# Patient Record
Sex: FEMALE
[De-identification: names, ages, dates, MRNs, and addresses within clinical notes are randomized per-mention and may not be internally consistent; named-entity substitution may affect disease eponyms.]

---

## 2021-01-14 PROBLEM — Z00.00 ENCOUNTER FOR PREVENTIVE HEALTH EXAMINATION: Status: ACTIVE | Noted: 2021-01-14

## 2021-01-15 ENCOUNTER — APPOINTMENT (OUTPATIENT)
Dept: OTOLARYNGOLOGY | Facility: CLINIC | Age: 61
End: 2021-01-15
Payer: COMMERCIAL

## 2021-01-15 VITALS — WEIGHT: 145 LBS | BODY MASS INDEX: 22.76 KG/M2 | HEIGHT: 67 IN | TEMPERATURE: 97.8 F

## 2021-01-15 DIAGNOSIS — Z78.9 OTHER SPECIFIED HEALTH STATUS: ICD-10-CM

## 2021-01-15 DIAGNOSIS — Z85.820 PERSONAL HISTORY OF MALIGNANT MELANOMA OF SKIN: ICD-10-CM

## 2021-01-15 DIAGNOSIS — H93.299 OTHER ABNORMAL AUDITORY PERCEPTIONS, UNSPECIFIED EAR: ICD-10-CM

## 2021-01-15 DIAGNOSIS — Z80.3 FAMILY HISTORY OF MALIGNANT NEOPLASM OF BREAST: ICD-10-CM

## 2021-01-15 DIAGNOSIS — Z82.49 FAMILY HISTORY OF ISCHEMIC HEART DISEASE AND OTHER DISEASES OF THE CIRCULATORY SYSTEM: ICD-10-CM

## 2021-01-15 DIAGNOSIS — Z86.39 PERSONAL HISTORY OF OTHER ENDOCRINE, NUTRITIONAL AND METABOLIC DISEASE: ICD-10-CM

## 2021-01-15 PROCEDURE — 92550 TYMPANOMETRY & REFLEX THRESH: CPT

## 2021-01-15 PROCEDURE — 99204 OFFICE O/P NEW MOD 45 MIN: CPT | Mod: 25

## 2021-01-15 PROCEDURE — 92557 COMPREHENSIVE HEARING TEST: CPT

## 2021-01-15 PROCEDURE — 99072 ADDL SUPL MATRL&STAF TM PHE: CPT

## 2021-01-15 PROCEDURE — 31231 NASAL ENDOSCOPY DX: CPT

## 2021-01-15 RX ORDER — FLUTICASONE PROPIONATE 50 UG/1
50 SPRAY, METERED NASAL
Qty: 1 | Refills: 5 | Status: ACTIVE | COMMUNITY
Start: 2021-01-15 | End: 1900-01-01

## 2021-01-21 LAB — BACTERIA FLD CULT: NORMAL

## 2021-01-21 NOTE — HISTORY OF PRESENT ILLNESS
[de-identified] : PILI MARQUEZ is a 50 year old female who comes in complaining of Inflammation of the left ear. Her ear feels clogged. Her sinuses feel inflamed. She has noted some green discharge. Her symptoms are worse on her left than her right. She had sinus surgery more than 10 years ago and she had been doing well. She notes that her reflux is more bothersome and she takes medication once in a while.She has nasal congestion and headaches. She describes it as a pressure, throbbing sensation along the forehead and the top of her head. She does not believe that she has allergies but has been having a fair amount of heartburn and the heavy mucus in her throat. She has to clear her throat constantly. She has not been on antibiotics.The patient had no other ear nose or throat complaints at this visit.

## 2021-01-21 NOTE — PHYSICAL EXAM
[FreeTextEntry1] : \par The patient was alert and oriented and in no distress.\par Voice was clear.\par \par Face:\par The patient had no facial asymmetry or mass.\par The skin was unremarkable.\par \par Eyes:\par The pupils were equal round and reactive to light and accommodation.\par There was no significant nystagmus or disconjugate gaze noted.\par \par Nose: \par The external nose had no significant deformity.  There was no facial tenderness.  On anterior rhinoscopy, the nasal mucosa was clear.  The anterior septum was midline.  There were no visualized polyps purulence  or masses.\par \par Oral cavity:\par The oral mucosa was normal.\par The oral and base of tongue were clear and without mass.\par The gingival and buccal mucosa were moist and without lesions.\par The palate moved well.\par There was no cleft to the palate.\par There appeared to be good salivary flow.  \par There was no pus, erythema or mass in the oral cavity.\par \par \par Ears:\par The external ears were normal without deformity.\par The ear canals were clear.\par The tympanic membranes were intact and normal.\par \par Neck: \par The neck was symmetrical.\par The parotid and submandibular glands were normal without masses.\par The trachea was midline and there was no unusual crepitus.\par The thyroid was smooth and nontender and no masses were palpated.\par There was no significant cervical adenopathy.\par \par \par Neuro:\par Neurologically, the patient was awake, alert, and oriented to person, place and time. There were no obvious focal neurologic abnormalities.  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  The tympanograms were type A and the otoacoustic emissions were intact bilaterally.\par The audiogram was reviewed with the patient. Numerous perhaps a minimal asymmetry but normal tympanograms.\par \par Nasal endoscopy:\par \par Nasal endoscopy was done with topical anesthesia and a flexible endoscope to evaluate for nasal polyps, chronic sinusitis and response to therapy.\par Endocoscopy was performed to inspect the interior of the nasal cavity, the nasal septum,  the middle and superior meati, the inferior, middle and superior turbinates, and the spheno-ethmoidal  recesses, the nasopharynx and eustachian tube orifices bilaterally\par \par Endoscopy was done with Covid precautions and with video. All risks and benefits were discussed with the patient and consent obtained.\par \par \par Evaluation showed that the septum was midline.  There was no significant mucosal disease.  The inferior turbinates and middle turbinates were normal.  On both sides, the surgically opened ethmoids, antra, and frontal recesses were clear.  There was no evidence of polypoid recurrences and no purulence.  The mucosa was close to stage zero.  The nasopharynx was benign.    The middle and  superior meati were normal and the sphenoethmoidal recesses were without evidence of disease\par \par Although all of her sinuses otherwise looked excellent, there was a moderate amount of bloody-colored discharge from the left antrum. This was cultured endoscopically and then suctioned.\par \par Flexible fiberoptic laryngoscopy: CPT 19709\par Indications: Dysphagia\par Procedure note:\par  \par Flexible fiberoptic laryngoscopy was performed because of dysphagia and the patient's inability to tolerate adequate mirror examination.\par The nasal cavity was anesthetized with Pontocaine plus Afrin.\par \par \par The nasal cavity was anesthetized with Pontocaine and Afrin.\par The flexible endoscope was placed into the patient's nasal cavity.\par The nasopharynx was without masses.\par The oropharynx, vallecula and base of tongue had no masses.\par The epiglottis, aryepiglottic folds and false vocal cords were normal.\par The pyriform sinuses were without mucosal lesions or pooling of secretions.  \par The laryngeal ventricles were without lesions.\par The visualized subglottis was without mass.\par The lateral and posterior pharyngeal walls were clear and symmetrical.\par The vocal folds were clear and mobile; they abducted and adducted normally.\par There was no interarytenoid mass or fullness.\par There was significant posterior laryngeal inflammation consistent with reflux.\par \par Endoscopy was done with Covid precautions and with video. All risks and benefits were discussed with the patient and consent obtained.

## 2021-01-21 NOTE — REVIEW OF SYSTEMS
[Ear Pain] : ear pain [Nasal Congestion] : nasal congestion [Negative] : Heme/Lymph [de-identified] : post nasal drip

## 2021-01-21 NOTE — CONSULT LETTER
[FreeTextEntry2] : THIERRY NUNN\par  [FreeTextEntry1] : \par \par Dear  Dr. THIERRY NUNN,\par \par I had the pleasure of seeing your patient today.  \par Please see my note below.\par \par \par Thank you very much for allowing me to participate in the care of your patient.\par \par Sincerely,\par \par \par I hope this note finds you and your family  well.   \par \par \par Alfredo Lim\par \par \par Lux Lim MD\par [facility]\par [facility]\par  [facility]\par \par

## 2021-01-21 NOTE — ASSESSMENT
[FreeTextEntry1] : It was my impression that she has E. or discomfort in the feeling that her ears are underwater. Some of this may be from her temporomandibular joint as she has a malocclusion but I did not find much inflammation or tenderness.\par Her sinuses, other than for her left antrum looked excellent but there was a left maxillary sinusitis.\par Pending culture results I recommended a course of cefuroxime, fluticasone and continuing with the nasal rinses. I felt she probably should not use the silver spray. It had been used and the pre-antibiotic era for infection as an antiseptic and there is a risk of argentism if it's a high dose and I am not certain of the dosage in this spray\par Some of the symptoms are likely from her reflux which appears to have exacerbated. She tells me that her gastroenterologist has changed her to a stronger reflux medication. She has not started this yet and I suggested continuing with her GI care as well and would like to reevaluate in 3- 4 weeks\par I would change antibiotics as indicated by her culture.\par \par \par 1/21/21    culture was noral chalino    ?anaerobe?   would finish rx.  RLP

## 2021-02-17 ENCOUNTER — APPOINTMENT (OUTPATIENT)
Dept: OTOLARYNGOLOGY | Facility: CLINIC | Age: 61
End: 2021-02-17
Payer: COMMERCIAL

## 2021-02-17 VITALS — HEIGHT: 67 IN | TEMPERATURE: 97.2 F | BODY MASS INDEX: 22.76 KG/M2 | WEIGHT: 145 LBS

## 2021-02-17 PROCEDURE — 31231 NASAL ENDOSCOPY DX: CPT

## 2021-02-17 PROCEDURE — 99072 ADDL SUPL MATRL&STAF TM PHE: CPT

## 2021-02-17 PROCEDURE — 99213 OFFICE O/P EST LOW 20 MIN: CPT | Mod: 25

## 2021-02-17 NOTE — HISTORY OF PRESENT ILLNESS
[de-identified] : PILI MARQUEZ was seen in followup on February 17. Her sinuses were no longer bothering her and her ear it no longer feels full. However, she had GI symptoms from the antibiotic. She also had to stop her Dexilant because of this.The patient had no other ear nose or throat complaints at this visit.

## 2021-02-17 NOTE — CONSULT LETTER
[FreeTextEntry2] : THIERRY NUNN\par  [FreeTextEntry1] : \par \par Dear  Dr. THIERRY NUNN,\par \par I had the pleasure of seeing your patient today.  \par Please see my note below.\par \par \par Thank you very much for allowing me to participate in the care of your patient.\par \par Sincerely,\par \par \par I hope this note finds you and your family  well.   \par \par \par Alfredo Lim\par \par \par Lux Lim MD\par [facility]\par [facility]\par  [facility]\par \par

## 2021-02-17 NOTE — ASSESSMENT
[FreeTextEntry1] : It was my impression that her sinusitis had clinically resolved and her sinuses all look closer to stage 0 at this time. She still has laryngeal evidence of reflux and it seems likely that she was refluxing as high as nasal cavities. She had GI symptoms from cefuroxime and I reviewed this with her.\par At this point, I suggested continuing to use nasal saline rinses as needed and followup for her GI care antireflux care and would like to see her back in followup as needed

## 2021-02-17 NOTE — PHYSICAL EXAM
[FreeTextEntry1] : \par The patient was alert and oriented and in no distress.\par Voice was clear.\par \par Face:\par The patient had no facial asymmetry or mass.\par The skin was unremarkable.\par \par Eyes:\par The pupils were equal round and reactive to light and accommodation.\par There was no significant nystagmus or disconjugate gaze noted.\par \par Nose: \par The external nose had no significant deformity.  There was no facial tenderness.  On anterior rhinoscopy, the nasal mucosa was clear.  The anterior septum was midline.  There were no visualized polyps purulence  or masses.\par \par Oral cavity:\par The oral mucosa was normal.\par The oral and base of tongue were clear and without mass.\par The gingival and buccal mucosa were moist and without lesions.\par The palate moved well.\par There was no cleft to the palate.\par There appeared to be good salivary flow.  \par There was no pus, erythema or mass in the oral cavity.\par \par \par Ears:\par The external ears were normal without deformity.\par The ear canals were clear.\par The tympanic membranes were intact and normal.\par \par Neck: \par The neck was symmetrical.\par The parotid and submandibular glands were normal without masses.\par The trachea was midline and there was no unusual crepitus.\par The thyroid was smooth and nontender and no masses were palpated.\par There was no significant cervical adenopathy.\par \par \par Neuro:\par Neurologically, the patient was awake, alert, and oriented to person, place and time. There were no obvious focal neurologic abnormalities.  Cranial nerves II through XII were grossly intact.\par \par \par TMJ:\par The temporomandibular joints were nontender.\par There was no abnormal crepitus and no significant malocclusion\par \par  [de-identified] : Nasal endoscopy:\par \par Nasal endoscopy was done with topical anesthesia and a flexible endoscope to evaluate for nasal polyps, chronic sinusitis and response to therapy.\par Endocoscopy was performed to inspect the interior of the nasal cavity, the nasal septum,  the middle and superior meati, the inferior, middle and superior turbinates, and the spheno-ethmoidal  recesses, the nasopharynx and eustachian tube orifices bilaterally\par \par Endoscopy was done with Covid precautions and with video. All risks and benefits were discussed with the patient and consent obtained.\par \par \par Evaluation showed that the septum was midline.  There was no significant mucosal disease.  The inferior turbinates and middle turbinates were normal.  On the left side sides, the surgically opened ethmoids, antrum, and frontal recesses were clear.  There was no evidence of polypoid recurrences and no purulence.  The mucosa was close to stage zero.  The nasopharynx was benign.    The middle and  superior meati were normal and the sphenoethmoidal recesses were without evidence of disease

## 2021-02-17 NOTE — REASON FOR VISIT
[Subsequent Evaluation] : a subsequent evaluation for [Sinusitis] : sinusitis [FreeTextEntry2] : reflux

## 2021-11-19 ENCOUNTER — APPOINTMENT (OUTPATIENT)
Dept: OTOLARYNGOLOGY | Facility: CLINIC | Age: 61
End: 2021-11-19

## 2022-09-29 ENCOUNTER — APPOINTMENT (OUTPATIENT)
Dept: OTOLARYNGOLOGY | Facility: CLINIC | Age: 62
End: 2022-09-29

## 2022-09-29 VITALS — TEMPERATURE: 97.3 F | HEIGHT: 67 IN | BODY MASS INDEX: 22.76 KG/M2 | WEIGHT: 145 LBS

## 2022-09-29 DIAGNOSIS — J32.9 CHRONIC SINUSITIS, UNSPECIFIED: ICD-10-CM

## 2022-09-29 DIAGNOSIS — M26.609 UNSPECIFIED TEMPOROMANDIBULAR JOINT DISORDER: ICD-10-CM

## 2022-09-29 DIAGNOSIS — J31.0 CHRONIC RHINITIS: ICD-10-CM

## 2022-09-29 DIAGNOSIS — K21.9 GASTRO-ESOPHAGEAL REFLUX DISEASE W/OUT ESOPHAGITIS: ICD-10-CM

## 2022-09-29 PROCEDURE — 99214 OFFICE O/P EST MOD 30 MIN: CPT | Mod: 25

## 2022-09-29 PROCEDURE — 31231 NASAL ENDOSCOPY DX: CPT

## 2022-09-29 RX ORDER — CEFUROXIME AXETIL 250 MG/1
250 TABLET ORAL
Qty: 20 | Refills: 1 | Status: COMPLETED | COMMUNITY
Start: 2021-01-15 | End: 2022-09-29

## 2022-09-29 RX ORDER — AZELASTINE HYDROCHLORIDE 137 UG/1
137 SPRAY, METERED NASAL
Qty: 1 | Refills: 3 | Status: ACTIVE | COMMUNITY
Start: 2022-09-29 | End: 1900-01-01

## 2022-09-29 NOTE — ASSESSMENT
[FreeTextEntry1] : It was my impression that her sinuses actually look quite good.  She does have some mucosal congestion and probable atopy.  I suggested continuing with her allergy care.  Her facial pain seems more consistent with her temporomandibular joint and possibly from reflux.\par It was my impression is that the patient's symptoms were from the temporomandibular joint.\par I recommended warm compresses, a soft diet, and anti-inflammatories.\par I would recommend following up further with the patient's dentist for a possible bruxism appliance if this fails to respond.\par She found Zyrtec sedating and I suggested switching to azelastine, continuing with the nasal rinse perhaps every other day and suggested that she could also use another antihistamine such as Allegra at night if needed.  Otherwise I reassured her and suggested repeat evaluation in 6 months or as needed

## 2022-09-29 NOTE — REVIEW OF SYSTEMS
[Nasal Congestion] : nasal congestion [Negative] : Heme/Lymph [de-identified] : nasal drainage / postnsasl drip and headache  [FreeTextEntry4] : facial pain  left popiteal wound well healing. no induration or flactuance

## 2022-09-29 NOTE — HISTORY OF PRESENT ILLNESS
[de-identified] : PILI MARQUEZ is a 62 year old female who comes in complaining of right-sided nasal congestion and facial pain and tenderness in her gingiva.  She had tried Zyrtec but it made her sleepy.  She does not like to use nasal steroid sprays and she prefers not to use NeilMed regularly.  She does have significant allergies and had an allergy to a yellowjacket bite.  She denies significant reflux the patient had no other ear nose or throat complaints at this visit.

## 2022-09-29 NOTE — PHYSICAL EXAM
[de-identified] : Nasal endoscopy:\par \par Nasal endoscopy was done with topical anesthesia and a flexible endoscope to evaluate for nasal polyps, chronic sinusitis and response to therapy.\par Endocoscopy was performed to inspect the interior of the nasal cavity, the nasal septum,  the middle and superior meati, the inferior, middle and superior turbinates, and the spheno-ethmoidal  recesses, the nasopharynx and eustachian tube orifices bilaterally\par \par Endoscopy was done with Covid precautions and with video. All risks and benefits were discussed with the patient and consent obtained.\par \par \par Evaluation showed that the septum was midline.  There was no significant mucosal disease.  The inferior turbinates and middle turbinates were normal.  On both sides, the surgically opened ethmoids, antra, and frontal recesses were clear.  There was no evidence of polypoid recurrences and no purulence.  The mucosa mildly boggy throughout. the nasopharynx was benign.    The middle and  superior meati were normal and the sphenoethmoidal recesses were without evidence of disease

## 2022-09-29 NOTE — CONSULT LETTER
[FreeTextEntry2] : THIERRY NUNN\par  [FreeTextEntry1] : \par \par Dear  Dr. THIERRY NUNN,\par \par I had the pleasure of seeing your patient today.  \par Please see my note below.\par \par \par Thank you very much for allowing me to participate in the care of your patient.\par \par Sincerely,\par \par \par Lux Lim MD\par [facility]\par [facility]\par  [facility]\par \par  [DrRad  ___] : Dr. AKINS [DrRad ___] : Dr. AKINS

## 2022-10-12 ENCOUNTER — APPOINTMENT (OUTPATIENT)
Dept: OTOLARYNGOLOGY | Facility: CLINIC | Age: 62
End: 2022-10-12

## 2023-03-22 ENCOUNTER — APPOINTMENT (OUTPATIENT)
Dept: OTOLARYNGOLOGY | Facility: CLINIC | Age: 63
End: 2023-03-22